# Patient Record
Sex: MALE | Race: BLACK OR AFRICAN AMERICAN | NOT HISPANIC OR LATINO | Employment: FULL TIME | URBAN - METROPOLITAN AREA
[De-identification: names, ages, dates, MRNs, and addresses within clinical notes are randomized per-mention and may not be internally consistent; named-entity substitution may affect disease eponyms.]

---

## 2017-02-17 RX ORDER — ALLOPURINOL 100 MG/1
TABLET ORAL
Qty: 90 TABLET | Refills: 0 | Status: SHIPPED | OUTPATIENT
Start: 2017-02-17 | End: 2017-05-16 | Stop reason: SDUPTHER

## 2017-05-16 RX ORDER — ALLOPURINOL 100 MG/1
TABLET ORAL
Qty: 90 TABLET | Refills: 0 | Status: SHIPPED | OUTPATIENT
Start: 2017-05-16 | End: 2017-08-15 | Stop reason: SDUPTHER

## 2017-07-19 ENCOUNTER — TELEPHONE (OUTPATIENT)
Dept: FAMILY MEDICINE | Facility: CLINIC | Age: 47
End: 2017-07-19

## 2017-07-19 RX ORDER — CEPHALEXIN 500 MG/1
1000 CAPSULE ORAL 2 TIMES DAILY
Qty: 28 CAPSULE | Refills: 0 | Status: SHIPPED | OUTPATIENT
Start: 2017-07-19 | End: 2017-08-21 | Stop reason: ALTCHOICE

## 2017-07-19 NOTE — TELEPHONE ENCOUNTER
----- Message from Angie aTpia sent at 7/19/2017  2:18 PM CDT -----  Patient requesting antibiotic. Has infected tooth. Started about 2 days ago with pain & bleeding. Patient in meeting all this week with work & is unable to come in for appointment. Has appointment with dentist next Tues. Just needs something to get relief until his appt

## 2017-07-19 NOTE — TELEPHONE ENCOUNTER
Patient advised medication sent to pharmacy and a OV with Dr. Wade is needed for Hypertension. Patient states he will contact office back to schedule

## 2017-08-15 RX ORDER — ALLOPURINOL 100 MG/1
TABLET ORAL
Qty: 90 TABLET | Refills: 0 | Status: SHIPPED | OUTPATIENT
Start: 2017-08-15 | End: 2017-08-21 | Stop reason: SDUPTHER

## 2017-08-21 ENCOUNTER — OFFICE VISIT (OUTPATIENT)
Dept: FAMILY MEDICINE | Facility: CLINIC | Age: 47
End: 2017-08-21
Payer: COMMERCIAL

## 2017-08-21 VITALS
WEIGHT: 231.5 LBS | SYSTOLIC BLOOD PRESSURE: 130 MMHG | OXYGEN SATURATION: 100 % | BODY MASS INDEX: 30.68 KG/M2 | HEART RATE: 100 BPM | DIASTOLIC BLOOD PRESSURE: 82 MMHG | TEMPERATURE: 99 F | HEIGHT: 73 IN

## 2017-08-21 DIAGNOSIS — T78.40XA ALLERGIC REACTION, INITIAL ENCOUNTER: Primary | ICD-10-CM

## 2017-08-21 PROCEDURE — 3008F BODY MASS INDEX DOCD: CPT | Mod: S$GLB,,, | Performed by: FAMILY MEDICINE

## 2017-08-21 PROCEDURE — 3075F SYST BP GE 130 - 139MM HG: CPT | Mod: S$GLB,,, | Performed by: FAMILY MEDICINE

## 2017-08-21 PROCEDURE — 99213 OFFICE O/P EST LOW 20 MIN: CPT | Mod: S$GLB,,, | Performed by: FAMILY MEDICINE

## 2017-08-21 PROCEDURE — 3079F DIAST BP 80-89 MM HG: CPT | Mod: S$GLB,,, | Performed by: FAMILY MEDICINE

## 2017-08-21 RX ORDER — TRAMADOL HYDROCHLORIDE 50 MG/1
50 TABLET ORAL EVERY 6 HOURS PRN
Qty: 30 TABLET | Refills: 0 | Status: SHIPPED | OUTPATIENT
Start: 2017-08-21 | End: 2017-08-31

## 2017-08-21 RX ORDER — PREDNISONE 20 MG/1
TABLET ORAL
Qty: 12 TABLET | Refills: 0 | Status: SHIPPED | OUTPATIENT
Start: 2017-08-21 | End: 2017-11-07

## 2017-08-21 RX ORDER — AMOXICILLIN 500 MG/1
1000 TABLET, FILM COATED ORAL EVERY 12 HOURS
Qty: 40 TABLET | Refills: 0 | Status: SHIPPED | OUTPATIENT
Start: 2017-08-21 | End: 2017-08-31

## 2017-08-28 NOTE — PROGRESS NOTES
Patient ID: José Roberson is a 46 y.o. male.    Chief Complaint: Medication Reaction (rash all over body)    HPI       José Roberson is a 46 y.o. male here complaining of hives on chest back arms and legs.  No wheezing no shortness of breath using some over-the-counter medicines without full relief.  Unsure exactly what caused with no new medicines ingested any new foods or other products.      Review of Symptoms    Constitutional  No change in activity, No chills fever   Resp  Neg hemoptysis, stridor, choking  CVS  Neg chest pain, palpitations    Physical Exam    Constitutional:   Oriented to person, place, and time.appears well-developed and well-nourished.   No distress.     HENT  Head: Normocephalic and atraumatic  Right Ear: External ear normal.   Left Ear: External ear normal.   Nose: External nose normal.   Mouth: Moist mucous membranes    Eyes:   Conjunctivae are normal. Right eye exhibits no discharge. Left eye exhibits no discharge. No scleral icterus. No periorbital edema    Pulmonary:  Lungs clear bilaterally without wheezing      Musculoskeletal:  No edema. No obvious deformity No waisting     Neurological:  Alert and oriented to person, place, and time. Coordination normal.     Skin:   Skin is warm and dry.  No diaphoresis.   Diffuse hives on trunk arms legs    Psychiatric: Normal mood and affect. Behavior is normal. Judgment and thought content normal.       Assessment / Plan:      ICD-10-CM ICD-9-CM   1. Allergic reaction, initial encounter T78.40XA 995.3     Allergic reaction, initial encounter  Comments:  Not severe-not involving respiratory    Other orders  -     amoxicillin (AMOXIL) 500 MG Tab; Take 2 tablets (1,000 mg total) by mouth every 12 (twelve) hours.  Dispense: 40 tablet; Refill: 0  -     tramadol (ULTRAM) 50 mg tablet; Take 1 tablet (50 mg total) by mouth every 6 (six) hours as needed for Pain.  Dispense: 30 tablet; Refill: 0  -     predniSONE (DELTASONE) 20 MG tablet; Two daily for  4 days then one daily for 4 days  Dispense: 12 tablet; Refill: 0

## 2017-10-30 ENCOUNTER — TELEPHONE (OUTPATIENT)
Dept: FAMILY MEDICINE | Facility: CLINIC | Age: 47
End: 2017-10-30

## 2017-10-30 DIAGNOSIS — E79.0 ELEVATED URIC ACID IN BLOOD: ICD-10-CM

## 2017-10-30 DIAGNOSIS — Z00.00 ROUTINE HEALTH MAINTENANCE: Primary | ICD-10-CM

## 2017-10-30 NOTE — TELEPHONE ENCOUNTER
----- Message from Angie Tapia sent at 10/30/2017  8:21 AM CDT -----  Patient needs order to wellness blood work. Patient would also like orders to include HIV testing. Please send orders to [facility]. Call patient once done

## 2017-11-01 LAB
ALBUMIN SERPL-MCNC: 4.5 G/DL (ref 3.6–5.1)
ALBUMIN/GLOB SERPL: 1.6 (CALC) (ref 1–2.5)
ALP SERPL-CCNC: 100 U/L (ref 40–115)
ALT SERPL-CCNC: 18 U/L (ref 9–46)
AST SERPL-CCNC: 28 U/L (ref 10–40)
BASOPHILS # BLD AUTO: 19 CELLS/UL (ref 0–200)
BASOPHILS NFR BLD AUTO: 0.3 %
BILIRUB SERPL-MCNC: 1 MG/DL (ref 0.2–1.2)
BUN SERPL-MCNC: 15 MG/DL (ref 7–25)
BUN/CREAT SERPL: NORMAL (CALC) (ref 6–22)
CALCIUM SERPL-MCNC: 9.5 MG/DL (ref 8.6–10.3)
CHLORIDE SERPL-SCNC: 106 MMOL/L (ref 98–110)
CHOLEST SERPL-MCNC: 166 MG/DL
CHOLEST/HDLC SERPL: 3.1 (CALC)
CO2 SERPL-SCNC: 25 MMOL/L (ref 20–31)
CREAT SERPL-MCNC: 1.09 MG/DL (ref 0.6–1.35)
EOSINOPHIL # BLD AUTO: 19 CELLS/UL (ref 15–500)
EOSINOPHIL NFR BLD AUTO: 0.3 %
ERYTHROCYTE [DISTWIDTH] IN BLOOD BY AUTOMATED COUNT: 13.3 % (ref 11–15)
GFR SERPL CREATININE-BSD FRML MDRD: 82 ML/MIN/1.73M2
GLOBULIN SER CALC-MCNC: 2.8 G/DL (CALC) (ref 1.9–3.7)
GLUCOSE SERPL-MCNC: 90 MG/DL (ref 65–99)
HBA1C MFR BLD: 5.2 % OF TOTAL HGB
HCT VFR BLD AUTO: 44.3 % (ref 38.5–50)
HDLC SERPL-MCNC: 54 MG/DL
HGB BLD-MCNC: 14.6 G/DL (ref 13.2–17.1)
HIV 1+2 AB+HIV1 P24 AG SERPL QL IA: NORMAL
LDLC SERPL CALC-MCNC: 98 MG/DL (CALC)
LYMPHOCYTES # BLD AUTO: 1843 CELLS/UL (ref 850–3900)
LYMPHOCYTES NFR BLD AUTO: 28.8 %
MCH RBC QN AUTO: 28.2 PG (ref 27–33)
MCHC RBC AUTO-ENTMCNC: 33 G/DL (ref 32–36)
MCV RBC AUTO: 85.5 FL (ref 80–100)
MONOCYTES # BLD AUTO: 320 CELLS/UL (ref 200–950)
MONOCYTES NFR BLD AUTO: 5 %
NEUTROPHILS # BLD AUTO: 4198 CELLS/UL (ref 1500–7800)
NEUTROPHILS NFR BLD AUTO: 65.6 %
NONHDLC SERPL-MCNC: 112 MG/DL (CALC)
PLATELET # BLD AUTO: 257 THOUSAND/UL (ref 140–400)
PMV BLD REES-ECKER: 10.6 FL (ref 7.5–12.5)
POTASSIUM SERPL-SCNC: 3.9 MMOL/L (ref 3.5–5.3)
PROT SERPL-MCNC: 7.3 G/DL (ref 6.1–8.1)
RBC # BLD AUTO: 5.18 MILLION/UL (ref 4.2–5.8)
SODIUM SERPL-SCNC: 140 MMOL/L (ref 135–146)
TRIGL SERPL-MCNC: 54 MG/DL
TSH SERPL-ACNC: 0.82 MIU/L (ref 0.4–4.5)
URATE SERPL-MCNC: 6.6 MG/DL (ref 4–8)
WBC # BLD AUTO: 6.4 THOUSAND/UL (ref 3.8–10.8)

## 2017-11-02 ENCOUNTER — TELEPHONE (OUTPATIENT)
Dept: FAMILY MEDICINE | Facility: CLINIC | Age: 47
End: 2017-11-02

## 2017-11-02 NOTE — TELEPHONE ENCOUNTER
----- Message from Shan Bridges MD sent at 11/1/2017  6:49 PM CDT -----  Lab Results are normal-  NO changes need to be made    Letter written

## 2017-11-07 ENCOUNTER — OFFICE VISIT (OUTPATIENT)
Dept: FAMILY MEDICINE | Facility: CLINIC | Age: 47
End: 2017-11-07
Payer: COMMERCIAL

## 2017-11-07 VITALS
TEMPERATURE: 98 F | BODY MASS INDEX: 30.7 KG/M2 | WEIGHT: 231.63 LBS | HEART RATE: 108 BPM | OXYGEN SATURATION: 100 % | SYSTOLIC BLOOD PRESSURE: 142 MMHG | HEIGHT: 73 IN | DIASTOLIC BLOOD PRESSURE: 84 MMHG

## 2017-11-07 DIAGNOSIS — R68.82 LOW LIBIDO: ICD-10-CM

## 2017-11-07 DIAGNOSIS — Z00.00 ROUTINE HEALTH MAINTENANCE: Primary | ICD-10-CM

## 2017-11-07 DIAGNOSIS — I10 HYPERTENSION, UNSPECIFIED TYPE: ICD-10-CM

## 2017-11-07 DIAGNOSIS — M10.9 GOUT, UNSPECIFIED CAUSE, UNSPECIFIED CHRONICITY, UNSPECIFIED SITE: ICD-10-CM

## 2017-11-07 DIAGNOSIS — N52.9 ERECTILE DYSFUNCTION, UNSPECIFIED ERECTILE DYSFUNCTION TYPE: ICD-10-CM

## 2017-11-07 PROCEDURE — 99396 PREV VISIT EST AGE 40-64: CPT | Mod: S$GLB,,, | Performed by: FAMILY MEDICINE

## 2017-11-07 RX ORDER — AMLODIPINE BESYLATE 10 MG/1
10 TABLET ORAL DAILY
Refills: 3 | COMMUNITY
Start: 2017-10-20 | End: 2018-02-18

## 2017-11-12 LAB
TESTOST FREE SERPL-MCNC: 68.8 PG/ML (ref 35–155)
TESTOST SERPL-MCNC: 486 NG/DL (ref 250–1100)

## 2017-11-13 NOTE — PROGRESS NOTES
Patient ID: José Roberson is a 46 y.o. male.    Chief Complaint: Annual Exam    HPI      José Roberson is a 46 y.o. male. here for annual exam.   Co of low libido and decreased tumescence  History of gout      Review of Symptoms    Constitutional: Negative.    HENT: Negative.    Eyes: Negative.    Respiratory: Negative.    Cardiovascular: Negative.    Gastrointestinal: Negative.    Endocrine: Negative.    Genitourinary: Negative.    Musculoskeletal: Negative.    Skin: Negative.    Allergic/Immunologic: Negative.    Neurological: Negative.    Hematological: Negative.    Psychiatric/Behavioral: Negative.      Except as above in HPI        Physical  Exam    Constitutional:  Oriented to person, place, and time. Appears well-developed and well-nourished.     HENT:   Head: Normocephalic and atraumatic.     Right Ear: Tympanic membrane, external ear and ear canal normal.     Left Ear: Tympanic membrane, external ear and ear canal normal.     Nose: Nose normal. No rhinorrhea or nasal deformity.     Mouth/Throat: Uvula is midline, oropharynx is clear and moist and mucous membranes are normal.      Eyes: Conjunctivae are normal. Right eye exhibits no discharge. Left eye exhibits no discharge. No scleral icterus.     Neck:  No JVD present. No tracheal deviation  []  Neck supple.   []  No Carotid bruit    Cardiovascular: Normal rate, regular rhythm and normal heart sounds.      Pulmonary/Chest: Effort normal and breath sounds normal. No stridor. No respiratory distress. No wheezes. No rales.      Musculoskeletal: Normal range of motion. No edema or tenderness.   No deformity     Lymphadenopathy:  No cervical adenopathy.     Neurological:  Alert and oriented to person, place, and time. Coordination normal.     Skin: Skin is warm and dry. No rash noted.     Psychiatric: Normal mood and affect. Speech is normal and behavior is normal. Judgment and thought content normal.     Complete Blood Count  Lab Results   Component Value  Date    RBC 5.18 10/31/2017    HGB 14.6 10/31/2017    HCT 44.3 10/31/2017    MCV 85.5 10/31/2017    MCH 28.2 10/31/2017    MCHC 33.0 10/31/2017    RDW 13.3 10/31/2017     10/31/2017    MPV 10.6 10/31/2017    GRAN 3.2 11/07/2016    GRAN 54.0 11/07/2016    LYMPH 1,843 10/31/2017    LYMPH 28.8 10/31/2017    MONO 320 10/31/2017    MONO 5.0 10/31/2017    EOS 19 10/31/2017    BASO 19 10/31/2017    EOSINOPHIL 0.3 10/31/2017    BASOPHIL 0.3 10/31/2017    DIFFMETHOD Automated 11/07/2016       Comprehensive Metabolic Panel  Lab Results   Component Value Date    GLU 90 10/31/2017    BUN 15 10/31/2017    CREATININE 1.09 10/31/2017     10/31/2017    K 3.9 10/31/2017     10/31/2017    PROT 7.3 10/31/2017    ALBUMIN 4.5 10/31/2017    BILITOT 1.0 10/31/2017    AST 28 10/31/2017    ALKPHOS 100 10/31/2017    CO2 25 10/31/2017    ALT 18 10/31/2017    EGFRNONAA 82 10/31/2017    ESTGFRAFRICA 95 10/31/2017       TSH  Lab Results   Component Value Date    TSH 0.82 10/31/2017       Assessment / Plan:      ICD-10-CM ICD-9-CM   1. Routine health maintenance Z00.00 V70.0   2. Hypertension, unspecified type I10 401.9   3. Gout, unspecified cause, unspecified chronicity, unspecified site M10.9 274.9   4. Low libido R68.82 799.81   5. Erectile dysfunction, unspecified erectile dysfunction type N52.9 607.84     Routine health maintenance    Hypertension, unspecified type    Gout, unspecified cause, unspecified chronicity, unspecified site    Low libido  -     Cancel: TESTOSTERONE, FREE, TOTAL; Future; Expected date: 11/21/2017  -     TESTOSTERONE, FREE, TOTAL; Future; Expected date: 11/21/2017    Erectile dysfunction, unspecified erectile dysfunction type  -     Cancel: TESTOSTERONE, FREE, TOTAL; Future; Expected date: 11/21/2017  -     TESTOSTERONE, FREE, TOTAL; Future; Expected date: 11/21/2017          Discussed how to stay healthy including: diet, exercise, refraining from smoking and discussed screening exams / tests  needed for age, sex and family Hx.

## 2017-11-14 ENCOUNTER — TELEPHONE (OUTPATIENT)
Dept: FAMILY MEDICINE | Facility: CLINIC | Age: 47
End: 2017-11-14

## 2017-11-14 NOTE — TELEPHONE ENCOUNTER
----- Message from Shan Bridges MD sent at 11/14/2017  8:52 AM CST -----  Testosterone level is  Normal - pt notified

## 2017-11-18 RX ORDER — ALLOPURINOL 100 MG/1
TABLET ORAL
Qty: 90 TABLET | Refills: 0 | Status: SHIPPED | OUTPATIENT
Start: 2017-11-18 | End: 2018-02-18 | Stop reason: SDUPTHER

## 2017-11-18 RX ORDER — AMLODIPINE BESYLATE 10 MG/1
TABLET ORAL
Qty: 90 TABLET | Refills: 0 | Status: SHIPPED | OUTPATIENT
Start: 2017-11-18 | End: 2018-02-18 | Stop reason: SDUPTHER

## 2018-02-18 RX ORDER — AMLODIPINE BESYLATE 10 MG/1
TABLET ORAL
Qty: 90 TABLET | Refills: 0 | Status: SHIPPED | OUTPATIENT
Start: 2018-02-18 | End: 2018-05-22 | Stop reason: SDUPTHER

## 2018-02-18 RX ORDER — ALLOPURINOL 100 MG/1
TABLET ORAL
Qty: 90 TABLET | Refills: 0 | Status: SHIPPED | OUTPATIENT
Start: 2018-02-18 | End: 2018-05-22 | Stop reason: SDUPTHER

## 2018-05-22 RX ORDER — ALLOPURINOL 100 MG/1
TABLET ORAL
Qty: 90 TABLET | Refills: 0 | Status: SHIPPED | OUTPATIENT
Start: 2018-05-22 | End: 2018-09-01 | Stop reason: SDUPTHER

## 2018-05-22 RX ORDER — AMLODIPINE BESYLATE 10 MG/1
TABLET ORAL
Qty: 90 TABLET | Refills: 0 | Status: SHIPPED | OUTPATIENT
Start: 2018-05-22 | End: 2018-09-01 | Stop reason: SDUPTHER

## 2018-09-01 ENCOUNTER — TELEPHONE (OUTPATIENT)
Dept: FAMILY MEDICINE | Facility: CLINIC | Age: 48
End: 2018-09-01

## 2018-09-01 RX ORDER — ALLOPURINOL 100 MG/1
TABLET ORAL
Qty: 90 TABLET | Refills: 0 | Status: SHIPPED | OUTPATIENT
Start: 2018-09-01 | End: 2018-12-03 | Stop reason: SDUPTHER

## 2018-09-01 RX ORDER — AMLODIPINE BESYLATE 10 MG/1
TABLET ORAL
Qty: 90 TABLET | Refills: 0 | Status: SHIPPED | OUTPATIENT
Start: 2018-09-01 | End: 2018-12-03 | Stop reason: SDUPTHER

## 2018-09-04 ENCOUNTER — OFFICE VISIT (OUTPATIENT)
Dept: FAMILY MEDICINE | Facility: CLINIC | Age: 48
End: 2018-09-04
Payer: COMMERCIAL

## 2018-09-04 VITALS
HEART RATE: 85 BPM | TEMPERATURE: 98 F | SYSTOLIC BLOOD PRESSURE: 142 MMHG | WEIGHT: 235 LBS | HEIGHT: 73 IN | DIASTOLIC BLOOD PRESSURE: 98 MMHG | BODY MASS INDEX: 31.14 KG/M2 | OXYGEN SATURATION: 100 %

## 2018-09-04 DIAGNOSIS — I10 ESSENTIAL HYPERTENSION: ICD-10-CM

## 2018-09-04 DIAGNOSIS — Z23 NEED FOR TETANUS BOOSTER: ICD-10-CM

## 2018-09-04 DIAGNOSIS — M25.473 ANKLE EDEMA: Primary | ICD-10-CM

## 2018-09-04 PROCEDURE — 90715 TDAP VACCINE 7 YRS/> IM: CPT | Mod: S$GLB,,, | Performed by: NURSE PRACTITIONER

## 2018-09-04 PROCEDURE — 90471 IMMUNIZATION ADMIN: CPT | Mod: S$GLB,,, | Performed by: NURSE PRACTITIONER

## 2018-09-04 PROCEDURE — 3008F BODY MASS INDEX DOCD: CPT | Mod: CPTII,S$GLB,, | Performed by: NURSE PRACTITIONER

## 2018-09-04 PROCEDURE — 3080F DIAST BP >= 90 MM HG: CPT | Mod: CPTII,S$GLB,, | Performed by: NURSE PRACTITIONER

## 2018-09-04 PROCEDURE — 3077F SYST BP >= 140 MM HG: CPT | Mod: CPTII,S$GLB,, | Performed by: NURSE PRACTITIONER

## 2018-09-04 PROCEDURE — 99213 OFFICE O/P EST LOW 20 MIN: CPT | Mod: 25,S$GLB,, | Performed by: NURSE PRACTITIONER

## 2018-09-04 RX ORDER — LISINOPRIL 20 MG/1
20 TABLET ORAL DAILY
Qty: 30 TABLET | Refills: 5 | Status: SHIPPED | OUTPATIENT
Start: 2018-09-04 | End: 2019-04-25 | Stop reason: SDUPTHER

## 2018-09-04 RX ORDER — HYDROCHLOROTHIAZIDE 12.5 MG/1
12.5 CAPSULE ORAL DAILY
Qty: 30 CAPSULE | Refills: 0 | Status: SHIPPED | OUTPATIENT
Start: 2018-09-04 | End: 2018-10-01 | Stop reason: SDUPTHER

## 2018-09-04 NOTE — PROGRESS NOTES
Subjective:       Patient ID: José Roberson is a 47 y.o. male.    Chief Complaint: Leg Swelling    Edema   This is a new problem. The current episode started in the past 7 days. The problem occurs intermittently. The problem has been unchanged. Associated symptoms include joint swelling. Pertinent negatives include no abdominal pain, anorexia, arthralgias, change in bowel habit, chest pain, chills, congestion, coughing, diaphoresis, fatigue, fever, headaches, myalgias, nausea, neck pain, numbness, rash, sore throat, swollen glands, urinary symptoms, vertigo, visual change, vomiting or weakness. He has tried nothing for the symptoms.     Review of Systems   Constitutional: Negative for chills, diaphoresis, fatigue and fever.   HENT: Negative for congestion and sore throat.    Respiratory: Negative for cough.    Cardiovascular: Positive for leg swelling. Negative for chest pain.   Gastrointestinal: Negative for abdominal pain, anorexia, change in bowel habit, nausea and vomiting.   Musculoskeletal: Positive for joint swelling. Negative for arthralgias, myalgias and neck pain.        Ankle swelling     Skin: Negative for rash.   Neurological: Negative for vertigo, weakness, numbness and headaches.       Objective:      Physical Exam   Constitutional: He is oriented to person, place, and time. He appears well-developed and well-nourished. No distress.   HENT:   Right Ear: External ear normal.   Left Ear: External ear normal.   Cardiovascular: Normal rate, regular rhythm and normal heart sounds.   No murmur heard.  Pulses:       Dorsalis pedis pulses are 2+ on the right side, and 2+ on the left side.   Pulmonary/Chest: Effort normal and breath sounds normal. No stridor. No respiratory distress.   Musculoskeletal: He exhibits edema.        Right ankle: He exhibits swelling.        Left ankle: He exhibits swelling.   Neurological: He is alert and oriented to person, place, and time.   Skin: Skin is warm and dry. He is not  diaphoretic.   Psychiatric: He has a normal mood and affect. His behavior is normal. Judgment and thought content normal.   Vitals reviewed.      Assessment:       1. Ankle edema    2. Need for tetanus booster    3. Essential hypertension        Plan:       Ankle edema  -     hydroCHLOROthiazide (MICROZIDE) 12.5 mg capsule; Take 1 capsule (12.5 mg total) by mouth once daily.  Dispense: 30 capsule; Refill: 0    Need for tetanus booster  -     (In Office Administered) Tdap Vaccine    Essential hypertension  -     lisinopril (PRINIVIL,ZESTRIL) 20 MG tablet; Take 1 tablet (20 mg total) by mouth once daily.  Dispense: 30 tablet; Refill: 5      Elevate legs  Limit salt intake  If swelling continues will d/c Norvasc  Continue monitoring BP at home call me with results

## 2018-09-04 NOTE — TELEPHONE ENCOUNTER
Contacted pt to schedule annual wellness exam,  Pt states he needed to be seen sooner due to feet swelling.   Scheduled pt same day appointment with ESTHELA Mota.

## 2018-09-21 ENCOUNTER — CLINICAL SUPPORT (OUTPATIENT)
Dept: FAMILY MEDICINE | Facility: CLINIC | Age: 48
End: 2018-09-21
Payer: COMMERCIAL

## 2018-09-21 ENCOUNTER — TELEPHONE (OUTPATIENT)
Dept: INTERNAL MEDICINE | Facility: CLINIC | Age: 48
End: 2018-09-21

## 2018-09-21 VITALS — SYSTOLIC BLOOD PRESSURE: 122 MMHG | DIASTOLIC BLOOD PRESSURE: 84 MMHG | HEART RATE: 79 BPM

## 2018-09-21 DIAGNOSIS — I10 HYPERTENSION, UNSPECIFIED TYPE: Primary | ICD-10-CM

## 2018-09-21 NOTE — PROGRESS NOTES
José Roberson 47 y.o. male is here today for Blood Pressure check.   History of HTN yes.    Review of patient's allergies indicates:  No Known Allergies  Creatinine   Date Value Ref Range Status   10/31/2017 1.09 0.60 - 1.35 mg/dL Final     Sodium   Date Value Ref Range Status   10/31/2017 140 135 - 146 mmol/L Final     Potassium   Date Value Ref Range Status   10/31/2017 3.9 3.5 - 5.3 mmol/L Final   ]  Patient verifies taking blood pressure medications on a regular basis at the same time of the day.     Current Outpatient Medications:     allopurinol (ZYLOPRIM) 100 MG tablet, TAKE 1 TABLET BY MOUTH EVERY DAY, Disp: 90 tablet, Rfl: 0    amLODIPine (NORVASC) 10 MG tablet, TAKE 1 TABLET(10 MG) BY MOUTH EVERY DAY, Disp: 90 tablet, Rfl: 0    hydroCHLOROthiazide (MICROZIDE) 12.5 mg capsule, Take 1 capsule (12.5 mg total) by mouth once daily., Disp: 30 capsule, Rfl: 0    lisinopril (PRINIVIL,ZESTRIL) 20 MG tablet, Take 1 tablet (20 mg total) by mouth once daily., Disp: 30 tablet, Rfl: 5  Does patient have record of home blood pressure readings no.  Last dose of blood pressure medication was taken at 7am this morning.  Patient is asymptomatic.   Complains of nothing at this time.    BP: 122/84 , Pulse: 79 .      Dr. Bridges notified.

## 2018-10-01 ENCOUNTER — TELEPHONE (OUTPATIENT)
Dept: FAMILY MEDICINE | Facility: CLINIC | Age: 48
End: 2018-10-01

## 2018-10-01 DIAGNOSIS — M25.473 ANKLE EDEMA: ICD-10-CM

## 2018-10-01 RX ORDER — HYDROCHLOROTHIAZIDE 12.5 MG/1
CAPSULE ORAL
Qty: 30 CAPSULE | Refills: 0 | Status: SHIPPED | OUTPATIENT
Start: 2018-10-01 | End: 2018-10-01 | Stop reason: SDUPTHER

## 2018-10-01 RX ORDER — HYDROCHLOROTHIAZIDE 12.5 MG/1
12.5 CAPSULE ORAL DAILY
Qty: 30 CAPSULE | Refills: 0 | Status: SHIPPED | OUTPATIENT
Start: 2018-10-01 | End: 2019-10-31 | Stop reason: SDUPTHER

## 2018-10-01 NOTE — TELEPHONE ENCOUNTER
----- Message from Angela Sanders sent at 10/1/2018  2:34 PM CDT -----  Contact: Sol martin/ [facility]/ [phone]  Pharmacy called in to get clarification on patient's prescription. hydroCHLOROthiazide (MICROZIDE) 12.5 mg capsule    Please call.

## 2018-12-03 RX ORDER — ALLOPURINOL 100 MG/1
TABLET ORAL
Qty: 90 TABLET | Refills: 0 | Status: SHIPPED | OUTPATIENT
Start: 2018-12-03 | End: 2019-03-18 | Stop reason: SDUPTHER

## 2018-12-03 RX ORDER — AMLODIPINE BESYLATE 10 MG/1
TABLET ORAL
Qty: 90 TABLET | Refills: 0 | Status: SHIPPED | OUTPATIENT
Start: 2018-12-03 | End: 2019-03-18 | Stop reason: SDUPTHER

## 2019-01-29 ENCOUNTER — CLINICAL SUPPORT (OUTPATIENT)
Dept: FAMILY MEDICINE | Facility: CLINIC | Age: 49
End: 2019-01-29
Payer: COMMERCIAL

## 2019-01-29 DIAGNOSIS — Z23 NEED FOR INFLUENZA VACCINATION: Primary | ICD-10-CM

## 2019-01-29 PROCEDURE — 90471 IMMUNIZATION ADMIN: CPT | Mod: S$GLB,,, | Performed by: FAMILY MEDICINE

## 2019-01-29 PROCEDURE — 90686 IIV4 VACC NO PRSV 0.5 ML IM: CPT | Mod: S$GLB,,, | Performed by: FAMILY MEDICINE

## 2019-01-29 PROCEDURE — 90686 FLU VACCINE (QUAD) GREATER THAN OR EQUAL TO 3YO PRESERVATIVE FREE IM: ICD-10-PCS | Mod: S$GLB,,, | Performed by: FAMILY MEDICINE

## 2019-01-29 PROCEDURE — 90471 FLU VACCINE (QUAD) GREATER THAN OR EQUAL TO 3YO PRESERVATIVE FREE IM: ICD-10-PCS | Mod: S$GLB,,, | Performed by: FAMILY MEDICINE

## 2019-03-18 RX ORDER — ALLOPURINOL 100 MG/1
TABLET ORAL
Qty: 90 TABLET | Refills: 0 | Status: SHIPPED | OUTPATIENT
Start: 2019-03-18 | End: 2019-06-20 | Stop reason: SDUPTHER

## 2019-03-18 RX ORDER — AMLODIPINE BESYLATE 10 MG/1
TABLET ORAL
Qty: 90 TABLET | Refills: 0 | Status: SHIPPED | OUTPATIENT
Start: 2019-03-18 | End: 2019-06-20 | Stop reason: SDUPTHER

## 2019-04-25 DIAGNOSIS — I10 ESSENTIAL HYPERTENSION: ICD-10-CM

## 2019-04-25 RX ORDER — LISINOPRIL 20 MG/1
20 TABLET ORAL DAILY
Qty: 30 TABLET | Refills: 5 | Status: SHIPPED | OUTPATIENT
Start: 2019-04-25 | End: 2019-11-19

## 2019-04-25 NOTE — TELEPHONE ENCOUNTER
----- Message from Vicky Rutherford sent at 4/25/2019 12:05 PM CDT -----  Type:  RX Refill Request    Who Called: José Roberson   Refill or New Rx:refill   RX Name and Strength:lisinopril (PRINIVIL,ZESTRIL) 20 MG tablet  How is the patient currently taking it? (ex. 1XDay): Take 1 tablet (20 mg total) by mouth once daily  Is this a 30 day or 90 day RX:30 day  Preferred Pharmacy with phone number: [address]  Local or Mail Order:local  Ordering Provider:Dr. Falcon  Would the patient rather a call back or a response via [facility]? callback  Best Call Back Number:[phone]  Additional Information:n/a

## 2019-04-25 NOTE — TELEPHONE ENCOUNTER
----- Message from Vicky Rutherford sent at 4/25/2019 12:05 PM CDT -----  Type:  RX Refill Request    Who Called: José Roberson   Refill or New Rx:refill   RX Name and Strength:lisinopril (PRINIVIL,ZESTRIL) 20 MG tablet  How is the patient currently taking it? (ex. 1XDay): Take 1 tablet (20 mg total) by mouth once daily  Is this a 30 day or 90 day RX:30 day  Preferred Pharmacy with phone number: [address]  Local or Mail Order:local  Ordering Provider:Dr. Falcon  Would the patient rather a call back or a response via [facility]? callback  Best Call Back Number:[phone]  Additional Information:n/a

## 2019-06-20 RX ORDER — AMLODIPINE BESYLATE 10 MG/1
TABLET ORAL
Qty: 90 TABLET | Refills: 0 | Status: SHIPPED | OUTPATIENT
Start: 2019-06-20 | End: 2019-10-02 | Stop reason: SDUPTHER

## 2019-06-20 RX ORDER — ALLOPURINOL 100 MG/1
TABLET ORAL
Qty: 90 TABLET | Refills: 0 | Status: SHIPPED | OUTPATIENT
Start: 2019-06-20 | End: 2019-10-02 | Stop reason: SDUPTHER

## 2019-10-02 ENCOUNTER — TELEPHONE (OUTPATIENT)
Dept: FAMILY MEDICINE | Facility: CLINIC | Age: 49
End: 2019-10-02

## 2019-10-02 RX ORDER — ALLOPURINOL 100 MG/1
TABLET ORAL
Qty: 30 TABLET | Refills: 0 | Status: SHIPPED | OUTPATIENT
Start: 2019-10-02 | End: 2019-10-31 | Stop reason: SDUPTHER

## 2019-10-02 RX ORDER — AMLODIPINE BESYLATE 10 MG/1
TABLET ORAL
Qty: 30 TABLET | Refills: 0 | Status: SHIPPED | OUTPATIENT
Start: 2019-10-02 | End: 2019-10-31 | Stop reason: SDUPTHER

## 2019-10-02 NOTE — TELEPHONE ENCOUNTER
Sent a refill for 30 pills   Needs to come in to see MD before additional medication given at lest blood pressure taken

## 2019-10-07 ENCOUNTER — PATIENT OUTREACH (OUTPATIENT)
Dept: ADMINISTRATIVE | Facility: HOSPITAL | Age: 49
End: 2019-10-07

## 2019-10-31 ENCOUNTER — TELEPHONE (OUTPATIENT)
Dept: FAMILY MEDICINE | Facility: CLINIC | Age: 49
End: 2019-10-31

## 2019-10-31 DIAGNOSIS — M25.473 ANKLE EDEMA: ICD-10-CM

## 2019-10-31 RX ORDER — ALLOPURINOL 100 MG/1
100 TABLET ORAL DAILY
Qty: 90 TABLET | Refills: 0 | Status: SHIPPED | OUTPATIENT
Start: 2019-10-31 | End: 2019-11-19 | Stop reason: SDUPTHER

## 2019-10-31 RX ORDER — HYDROCHLOROTHIAZIDE 12.5 MG/1
12.5 CAPSULE ORAL DAILY
Qty: 90 CAPSULE | Refills: 0 | Status: SHIPPED | OUTPATIENT
Start: 2019-10-31 | End: 2019-11-19

## 2019-10-31 RX ORDER — AMLODIPINE BESYLATE 10 MG/1
10 TABLET ORAL DAILY
Qty: 90 TABLET | Refills: 0 | Status: SHIPPED | OUTPATIENT
Start: 2019-10-31 | End: 2019-11-19 | Stop reason: SDUPTHER

## 2019-10-31 NOTE — TELEPHONE ENCOUNTER
----- Message from Ita Molina sent at 10/31/2019  2:09 PM CDT -----  Contact: [phone]/SELF  Type:  Sooner Apoointment Request  .  Name of Caller: call  When is the first available appointment? 11/19  Symptoms: ANNUAL/medication refills  Would the patient rather a call back or a response via [facility]?  call    Additional Information:  Only [place]    I spoke with the pt - he is scheduled for 11/19/19  With dr Nelly menjivar gave him 30 day supply but his insurance will not fill it   It has to be 90 day  He will come in sooner but nothing available  Could you refill 90 days until his appt  Last appt was 9/2018    Pt needs allopurinol, amlodipine and hctz  He said he is not taking the lisinopril

## 2019-11-19 ENCOUNTER — OFFICE VISIT (OUTPATIENT)
Dept: FAMILY MEDICINE | Facility: CLINIC | Age: 49
End: 2019-11-19
Payer: COMMERCIAL

## 2019-11-19 VITALS
DIASTOLIC BLOOD PRESSURE: 104 MMHG | WEIGHT: 243.81 LBS | BODY MASS INDEX: 32.31 KG/M2 | SYSTOLIC BLOOD PRESSURE: 176 MMHG | OXYGEN SATURATION: 98 % | HEART RATE: 92 BPM | HEIGHT: 73 IN | TEMPERATURE: 98 F

## 2019-11-19 DIAGNOSIS — Z00.00 ROUTINE HEALTH MAINTENANCE: Primary | ICD-10-CM

## 2019-11-19 DIAGNOSIS — I10 ESSENTIAL HYPERTENSION: ICD-10-CM

## 2019-11-19 DIAGNOSIS — N52.9 ERECTILE DYSFUNCTION, UNSPECIFIED ERECTILE DYSFUNCTION TYPE: ICD-10-CM

## 2019-11-19 DIAGNOSIS — I10 HYPERTENSION, UNSPECIFIED TYPE: ICD-10-CM

## 2019-11-19 PROCEDURE — 3080F PR MOST RECENT DIASTOLIC BLOOD PRESSURE >= 90 MM HG: ICD-10-PCS | Mod: CPTII,S$GLB,, | Performed by: FAMILY MEDICINE

## 2019-11-19 PROCEDURE — 3077F PR MOST RECENT SYSTOLIC BLOOD PRESSURE >= 140 MM HG: ICD-10-PCS | Mod: CPTII,S$GLB,, | Performed by: FAMILY MEDICINE

## 2019-11-19 PROCEDURE — 99396 PREV VISIT EST AGE 40-64: CPT | Mod: S$GLB,,, | Performed by: FAMILY MEDICINE

## 2019-11-19 PROCEDURE — 99396 PR PREVENTIVE VISIT,EST,40-64: ICD-10-PCS | Mod: S$GLB,,, | Performed by: FAMILY MEDICINE

## 2019-11-19 PROCEDURE — 3077F SYST BP >= 140 MM HG: CPT | Mod: CPTII,S$GLB,, | Performed by: FAMILY MEDICINE

## 2019-11-19 PROCEDURE — 3080F DIAST BP >= 90 MM HG: CPT | Mod: CPTII,S$GLB,, | Performed by: FAMILY MEDICINE

## 2019-11-19 RX ORDER — IRBESARTAN 150 MG/1
150 TABLET ORAL NIGHTLY
Qty: 90 TABLET | Refills: 3 | Status: SHIPPED | OUTPATIENT
Start: 2019-11-19 | End: 2020-02-19

## 2019-11-19 RX ORDER — TERBINAFINE HYDROCHLORIDE 250 MG/1
TABLET ORAL
Qty: 30 TABLET | Refills: 1 | Status: SHIPPED | OUTPATIENT
Start: 2019-11-19 | End: 2021-04-26

## 2019-11-19 RX ORDER — AMLODIPINE BESYLATE 10 MG/1
10 TABLET ORAL DAILY
Qty: 90 TABLET | Refills: 0 | Status: SHIPPED | OUTPATIENT
Start: 2019-11-19 | End: 2020-02-16

## 2019-11-19 RX ORDER — ALLOPURINOL 100 MG/1
100 TABLET ORAL DAILY
Qty: 90 TABLET | Refills: 0 | Status: SHIPPED | OUTPATIENT
Start: 2019-11-19 | End: 2020-02-16

## 2019-11-20 RX ORDER — LISINOPRIL 20 MG/1
TABLET ORAL
Qty: 90 TABLET | Refills: 1 | OUTPATIENT
Start: 2019-11-20

## 2019-11-23 LAB
ALBUMIN SERPL-MCNC: 4.3 G/DL (ref 3.6–5.1)
ALBUMIN/GLOB SERPL: 1.6 (CALC) (ref 1–2.5)
ALP SERPL-CCNC: 85 U/L (ref 40–115)
ALT SERPL-CCNC: 33 U/L (ref 9–46)
AST SERPL-CCNC: 32 U/L (ref 10–40)
BASOPHILS # BLD AUTO: 20 CELLS/UL (ref 0–200)
BASOPHILS NFR BLD AUTO: 0.4 %
BILIRUB SERPL-MCNC: 0.8 MG/DL (ref 0.2–1.2)
BUN SERPL-MCNC: 11 MG/DL (ref 7–25)
BUN/CREAT SERPL: NORMAL (CALC) (ref 6–22)
CALCIUM SERPL-MCNC: 9.2 MG/DL (ref 8.6–10.3)
CHLORIDE SERPL-SCNC: 105 MMOL/L (ref 98–110)
CHOLEST SERPL-MCNC: 162 MG/DL
CHOLEST/HDLC SERPL: 2.9 (CALC)
CO2 SERPL-SCNC: 27 MMOL/L (ref 20–32)
CREAT SERPL-MCNC: 1.09 MG/DL (ref 0.6–1.35)
EOSINOPHIL # BLD AUTO: 30 CELLS/UL (ref 15–500)
EOSINOPHIL NFR BLD AUTO: 0.6 %
ERYTHROCYTE [DISTWIDTH] IN BLOOD BY AUTOMATED COUNT: 13.2 % (ref 11–15)
GFRSERPLBLD MDRD-ARVRAT: 81 ML/MIN/1.73M2
GLOBULIN SER CALC-MCNC: 2.7 G/DL (CALC) (ref 1.9–3.7)
GLUCOSE SERPL-MCNC: 79 MG/DL (ref 65–139)
HCT VFR BLD AUTO: 44.3 % (ref 38.5–50)
HDLC SERPL-MCNC: 55 MG/DL
HGB BLD-MCNC: 15.2 G/DL (ref 13.2–17.1)
LDLC SERPL CALC-MCNC: 93 MG/DL (CALC)
LYMPHOCYTES # BLD AUTO: 2060 CELLS/UL (ref 850–3900)
LYMPHOCYTES NFR BLD AUTO: 41.2 %
MCH RBC QN AUTO: 29.9 PG (ref 27–33)
MCHC RBC AUTO-ENTMCNC: 34.3 G/DL (ref 32–36)
MCV RBC AUTO: 87.2 FL (ref 80–100)
MONOCYTES # BLD AUTO: 290 CELLS/UL (ref 200–950)
MONOCYTES NFR BLD AUTO: 5.8 %
NEUTROPHILS # BLD AUTO: 2600 CELLS/UL (ref 1500–7800)
NEUTROPHILS NFR BLD AUTO: 52 %
NONHDLC SERPL-MCNC: 107 MG/DL (CALC)
PLATELET # BLD AUTO: 215 THOUSAND/UL (ref 140–400)
PMV BLD REES-ECKER: 11 FL (ref 7.5–12.5)
POTASSIUM SERPL-SCNC: 3.7 MMOL/L (ref 3.5–5.3)
PROT SERPL-MCNC: 7 G/DL (ref 6.1–8.1)
RBC # BLD AUTO: 5.08 MILLION/UL (ref 4.2–5.8)
SODIUM SERPL-SCNC: 140 MMOL/L (ref 135–146)
TESTOST FREE SERPL-MCNC: 49.2 PG/ML (ref 46–224)
TRIGL SERPL-MCNC: 53 MG/DL
TSH SERPL-ACNC: 2.08 MIU/L (ref 0.4–4.5)
WBC # BLD AUTO: 5 THOUSAND/UL (ref 3.8–10.8)

## 2019-11-24 NOTE — PROGRESS NOTES
" Patient ID: José Roberson is a 48 y.o. male.    Chief Complaint: Annual Exam    HPI      José Roberson is a 48 y.o. male. here for annual exam.   Complains of a toenail changes and ED.  Also patient with hypertension is not controlled.  Patient stop taking lisinopril because afraid of what it does to ED  Gout-no new problems  Complains of hard dark and toenails.  Would like treatment.        Review of Symptoms    Constitutional: Negative.    HENT: Negative.    Eyes: Negative.    Respiratory: Negative.    Cardiovascular: Negative.    Gastrointestinal: Negative.    Endocrine: Negative.    Genitourinary: Negative.    Musculoskeletal: Negative.    Skin: Negative.    Allergic/Immunologic: Negative.    Neurological: Negative.    Hematological: Negative.    Psychiatric/Behavioral: Negative.      Except as above in HPI      Vitals:    11/19/19 1403 11/19/19 1417   BP: (!) 192/108 (!) 176/104   BP Location:  Left arm   Pulse: 92    Temp: 97.9 °F (36.6 °C)    SpO2: 98%    Weight: 110.6 kg (243 lb 13.3 oz)    Height: 6' 1" (1.854 m)         Physical  Exam      Constitutional:  Oriented to person, place, and time. Appears well-developed and well-nourished.     HENT:   Head: Normocephalic and atraumatic.     Right Ear: Tympanic membrane, ear canal and External ear normal     Left Ear: Tympanic membrane, ear canal and External ear normal     Nose: Nose normal. No rhinorrhea or nasal deformity.     Mouth/Throat: Uvula is midline, oropharynx is clear and moist and mucous membranes are normal.      Eyes: Conjunctivae are normal. Right eye exhibits no discharge. Left eye exhibits no discharge. No scleral icterus.     Neck:  No JVD present. No tracheal deviation  [x]  Neck supple.   [x]  No Carotid bruit    Cardiovascular:  Regular rate and rhythm with normal S1 and S2     Pulmonary/Chest:   Clear to auscultation bilaterally without wheezes, rhonchi or rales    Musculoskeletal: Normal range of motion. No edema or tenderness.   No " deformity     Lymphadenopathy:  No cervical adenopathy.     Neurological:  Alert and oriented to person, place, and time. Coordination normal.     Skin: Skin is warm and dry. No rash noted.   Toe nails-dark thick  Psychiatric: Normal mood and affect. Speech is normal and behavior is normal. Judgment and thought content normal.     Complete Blood Count  Lab Results   Component Value Date    RBC 5.08 11/20/2019    HGB 15.2 11/20/2019    HCT 44.3 11/20/2019    MCV 87.2 11/20/2019    MCH 29.9 11/20/2019    MCHC 34.3 11/20/2019    RDW 13.2 11/20/2019     11/20/2019    MPV 11.0 11/20/2019    GRAN 3.2 11/07/2016    GRAN 54.0 11/07/2016    LYMPH 2,060 11/20/2019    LYMPH 41.2 11/20/2019    MONO 290 11/20/2019    MONO 5.8 11/20/2019    EOS 30 11/20/2019    BASO 20 11/20/2019    EOSINOPHIL 0.6 11/20/2019    BASOPHIL 0.4 11/20/2019    DIFFMETHOD Automated 11/07/2016       Comprehensive Metabolic Panel  Lab Results   Component Value Date    GLU 79 11/20/2019    BUN 11 11/20/2019    CREATININE 1.09 11/20/2019     11/20/2019    K 3.7 11/20/2019     11/20/2019    PROT 7.0 11/20/2019    ALBUMIN 4.3 11/20/2019    BILITOT 0.8 11/20/2019    AST 32 11/20/2019    ALKPHOS 85 11/20/2019    CO2 27 11/20/2019    ALT 33 11/20/2019    EGFRNONAA 81 11/20/2019    ESTGFRAFRICA 94 11/20/2019       TSH  Lab Results   Component Value Date    TSH 2.08 11/20/2019       Assessment / Plan:      ICD-10-CM ICD-9-CM   1. Routine health maintenance Z00.00 V70.0   2. Hypertension, unspecified type I10 401.9   3. Essential hypertension I10 401.9   4. Erectile dysfunction, unspecified erectile dysfunction type N52.9 607.84     Routine health maintenance  -     Testosterone, free; Future; Expected date: 11/19/2019  -     Comprehensive metabolic panel; Future; Expected date: 11/19/2019  -     CBC auto differential; Future; Expected date: 11/19/2019  -     Lipid panel; Future; Expected date: 11/19/2019  -     TSH; Future; Expected date:  11/19/2019    Hypertension, unspecified type  -     Testosterone, free; Future; Expected date: 11/19/2019  -     Comprehensive metabolic panel; Future; Expected date: 11/19/2019  -     CBC auto differential; Future; Expected date: 11/19/2019  -     Lipid panel; Future; Expected date: 11/19/2019  -     TSH; Future; Expected date: 11/19/2019    Essential hypertension  -     Testosterone, free; Future; Expected date: 11/19/2019  -     Comprehensive metabolic panel; Future; Expected date: 11/19/2019  -     CBC auto differential; Future; Expected date: 11/19/2019  -     Lipid panel; Future; Expected date: 11/19/2019  -     TSH; Future; Expected date: 11/19/2019    Erectile dysfunction, unspecified erectile dysfunction type  -     Testosterone, free; Future; Expected date: 11/19/2019    Other orders  -     allopurinol (ZYLOPRIM) 100 MG tablet; Take 1 tablet (100 mg total) by mouth once daily. For gout prevention  Dispense: 90 tablet; Refill: 0  -     amLODIPine (NORVASC) 10 MG tablet; Take 1 tablet (10 mg total) by mouth once daily. For high blood pressure  Dispense: 90 tablet; Refill: 0  -     irbesartan (AVAPRO) 150 MG tablet; Take 1 tablet (150 mg total) by mouth every evening. For blood pressure  Dispense: 90 tablet; Refill: 3  -     terbinafine HCl (LAMISIL) 250 mg tablet; Two po the first 5 days of the month for 3 mo  Dispense: 30 tablet; Refill: 1          Discussed how to stay healthy including: diet, exercise, refraining from smoking and discussed screening exams / tests needed for age, sex and family Hx.

## 2020-02-07 ENCOUNTER — TELEPHONE (OUTPATIENT)
Dept: FAMILY MEDICINE | Facility: CLINIC | Age: 50
End: 2020-02-07

## 2020-02-07 NOTE — TELEPHONE ENCOUNTER
LM advising patient to contact office to discuss test results       ----- Message from Shona Mcgregor sent at 2/7/2020  2:19 PM CST -----  Contact: Self [phone]  Patient would like to speak with you about getting test results. Please advise.

## 2020-02-07 NOTE — TELEPHONE ENCOUNTER
Vicky MULTANI [facility] Staff   Caller: [phone]/self (Today,  2:58 PM)             Patient called in returning your call. Please advise.

## 2020-02-07 NOTE — TELEPHONE ENCOUNTER
I spoke with the pt about his last labs in November   His testosterone level was within range - 49 but barely   Pt is symptomatic  He would like you to call in meds  But no injections    I advised the pt I would get in touch with him next week once Dr Bridges reviews the message as he is out of the office today    [facility]

## 2020-02-07 NOTE — TELEPHONE ENCOUNTER
If pt is referring to last labs in nov - sent to portal ( never reviewed ) also letter was mailed to the pt     See info below

## 2020-02-08 NOTE — TELEPHONE ENCOUNTER
Do not feel comfortable replacing testosterone when it is normal    Would suggest he sees someone like Dr. Herrera      I can make the referral if he would like

## 2020-02-10 NOTE — TELEPHONE ENCOUNTER
Patient advised of recommendation for starting testosterone therapy. Patient got very upset started divina stating he's right at the baseline what do you mean that that's normal and why does he have to see a specialist when the results are showing that his levels are low. He states he want's to speak to his doctor, why can't he speak with him. Patient requesting a call back from Dr. Wade only

## 2020-02-16 RX ORDER — AMLODIPINE BESYLATE 10 MG/1
TABLET ORAL
Qty: 90 TABLET | Refills: 0 | Status: SHIPPED | OUTPATIENT
Start: 2020-02-16 | End: 2020-05-12

## 2020-02-16 RX ORDER — ALLOPURINOL 100 MG/1
100 TABLET ORAL DAILY
Qty: 90 TABLET | Refills: 0 | Status: SHIPPED | OUTPATIENT
Start: 2020-02-16 | End: 2020-05-12

## 2020-02-19 RX ORDER — IRBESARTAN 150 MG/1
150 TABLET ORAL NIGHTLY
Qty: 90 TABLET | Refills: 3 | Status: SHIPPED | OUTPATIENT
Start: 2020-02-19 | End: 2021-03-03

## 2020-03-20 ENCOUNTER — TELEPHONE (OUTPATIENT)
Dept: FAMILY MEDICINE | Facility: CLINIC | Age: 50
End: 2020-03-20

## 2020-03-20 ENCOUNTER — OFFICE VISIT (OUTPATIENT)
Dept: FAMILY MEDICINE | Facility: CLINIC | Age: 50
End: 2020-03-20
Payer: COMMERCIAL

## 2020-03-20 VITALS
OXYGEN SATURATION: 98 % | HEART RATE: 94 BPM | SYSTOLIC BLOOD PRESSURE: 142 MMHG | BODY MASS INDEX: 32.02 KG/M2 | DIASTOLIC BLOOD PRESSURE: 86 MMHG | TEMPERATURE: 99 F | HEIGHT: 73 IN | WEIGHT: 241.63 LBS

## 2020-03-20 DIAGNOSIS — I10 ESSENTIAL HYPERTENSION: ICD-10-CM

## 2020-03-20 DIAGNOSIS — B34.9 VIRAL SYNDROME: Primary | ICD-10-CM

## 2020-03-20 PROCEDURE — 3008F BODY MASS INDEX DOCD: CPT | Mod: CPTII,S$GLB,, | Performed by: FAMILY MEDICINE

## 2020-03-20 PROCEDURE — 3077F SYST BP >= 140 MM HG: CPT | Mod: CPTII,S$GLB,, | Performed by: FAMILY MEDICINE

## 2020-03-20 PROCEDURE — 3077F PR MOST RECENT SYSTOLIC BLOOD PRESSURE >= 140 MM HG: ICD-10-PCS | Mod: CPTII,S$GLB,, | Performed by: FAMILY MEDICINE

## 2020-03-20 PROCEDURE — 99213 PR OFFICE/OUTPT VISIT, EST, LEVL III, 20-29 MIN: ICD-10-PCS | Mod: S$GLB,,, | Performed by: FAMILY MEDICINE

## 2020-03-20 PROCEDURE — 99999 PR PBB SHADOW E&M-EST. PATIENT-LVL III: CPT | Mod: PBBFAC,,, | Performed by: FAMILY MEDICINE

## 2020-03-20 PROCEDURE — 3079F DIAST BP 80-89 MM HG: CPT | Mod: CPTII,S$GLB,, | Performed by: FAMILY MEDICINE

## 2020-03-20 PROCEDURE — 99999 PR PBB SHADOW E&M-EST. PATIENT-LVL III: ICD-10-PCS | Mod: PBBFAC,,, | Performed by: FAMILY MEDICINE

## 2020-03-20 PROCEDURE — 3008F PR BODY MASS INDEX (BMI) DOCUMENTED: ICD-10-PCS | Mod: CPTII,S$GLB,, | Performed by: FAMILY MEDICINE

## 2020-03-20 PROCEDURE — 99213 OFFICE O/P EST LOW 20 MIN: CPT | Mod: S$GLB,,, | Performed by: FAMILY MEDICINE

## 2020-03-20 PROCEDURE — 3079F PR MOST RECENT DIASTOLIC BLOOD PRESSURE 80-89 MM HG: ICD-10-PCS | Mod: CPTII,S$GLB,, | Performed by: FAMILY MEDICINE

## 2020-03-20 NOTE — PROGRESS NOTES
Office Visit    Patient Name: José Roberson    : 1970  MRN: [id number]    Subjective:  José is a 49 y.o. male who presents today for:    Cough (started on Saturday.  No fever or SOB); Chills (and sweats); and Generalized Body Aches    49-year-old generally healthy patient with history of controlled hypertension but no history of tobacco abuse or lung disease who presents today with almost 1 week history of cough, now accompanied by body aches/chills/fatigue.  He did not take his temperature at home, but he does not have a fever this morning.  He did not take medications this morning.  He took some NyQuil last night to help with body aches and headache, and this did allow him to sleep, and he reports that he feels okay this morning.  His biggest concern is knowing what to do in terms of his work situation.  He denies shortness of breath.    Past Medical History  Past Medical History:   Diagnosis Date    Gout     HTN (hypertension)        Past Surgical History  Past Surgical History:   Procedure Laterality Date    APPENDECTOMY      CHEST TUBE INSERTION      right shoulder surgery         Family History  Family History   Problem Relation Age of Onset    Hypertension Father     Hypertension Mother        Social History  Social History     Socioeconomic History    Marital status: Single     Spouse name: Not on file    Number of children: Not on file    Years of education: Not on file    Highest education level: Not on file   Occupational History    Occupation:      Comment: wine    Social Needs    Financial resource strain: Not on file    Food insecurity:     Worry: Not on file     Inability: Not on file    Transportation needs:     Medical: Not on file     Non-medical: Not on file   Tobacco Use    Smoking status: Never Smoker    Smokeless tobacco: Never Used   Substance and Sexual Activity    Alcohol use: No    Drug use: No    Sexual activity: Yes   Lifestyle    Physical activity:  "    Days per week: Not on file     Minutes per session: Not on file    Stress: Not on file   Relationships    Social connections:     Talks on phone: Not on file     Gets together: Not on file     Attends Religion service: Not on file     Active member of club or organization: Not on file     Attends meetings of clubs or organizations: Not on file     Relationship status: Not on file   Other Topics Concern    Not on file   Social History Narrative    Not on file       Current Medications  Medications reviewed and updated.     Allergies   Review of patient's allergies indicates:  No Known Allergies    Review of Systems (Pertinent positives)  Review of Systems   Constitutional: Positive for chills and fatigue.   Respiratory: Positive for cough. Negative for shortness of breath.    Musculoskeletal: Positive for myalgias.       BP (!) 142/86 (BP Location: Right arm, Patient Position: Sitting)   Pulse 94   Temp 98.6 °F (37 °C) (Oral)   Ht 6' 1" (1.854 m)   Wt 109.6 kg (241 lb 10 oz)   SpO2 98%   BMI 31.88 kg/m²     Physical Exam   Constitutional: He is oriented to person, place, and time. He appears well-developed and well-nourished. No distress.   Pulmonary/Chest: Effort normal.   Neurological: He is alert and oriented to person, place, and time.   Psychiatric: He has a normal mood and affect.   Vitals reviewed.    Patient not directly examined to avoid need for PPE donning (possible COVID-19 patient) as he is not high risk, he is clinically stable and with normal vitals/oxygen saturation.    Assessment/Plan:  José Roberson is a 49 y.o. male who presents today for :    José was seen today for cough, chills and generalized body aches.    Diagnoses and all orders for this visit:    Viral syndrome  Comments:  Does not meet criteria for COVID-19 testing. cannot R/O. advised on self-quarantine guidelines, supportive care, and ER precautions. work note given.     Essential hypertension  Comments:  mildly " elevated today but much improved form previous reading on Avapro 150.             ICD-10-CM ICD-9-CM    1. Viral syndrome B34.9 079.99     Does not meet criteria for COVID-19 testing. cannot R/O. advised on self-quarantine guidelines, supportive care, and ER precautions. work note given.    2. Essential hypertension I10 401.9     mildly elevated today but much improved form previous reading on Avapro 150.        Patient Instructions   PATIENT ADVISED TO SELF-QUARANTINE SECONDARY TO CURRENT VIRAL SYMPTOMS.    ADVISED THAT HE MAY HAVE FLU VERSUS COVID-19, BUT HE DOES NOT MEET CLINICAL CRITERIA AT THIS TIME FOR COVID- 19 TESTING.    HE IS ADVISED TO SELF- QUARANTINE UNTIL HE HAS NO SYSTEMIC SYMPTOMS SUCH AS FEVER/CHILLS/BODY ACHES FOR AT LEAST 72 HOURS WITHOUT THE USE OF FEVER REDUCING MEDICATIONS.    HE IS ADVISED TO TAKE TYLENOL EVERY 6 HOURS AS NEEDED FOR FEVER/BODY ACHES, HYDRATE, REST.    PLEASE SEEK URGENT MEDICAL ATTENTION AT THE ER IF YOU ARE DEVELOPING SYMPTOMS SUCH AS SHORTNESS OF BREATH, OTHERWISE YOUR SYMPTOMS SHOULD SELF RESOLVE WITHIN AN ADDITIONAL WEEK.        Follow up for return as needed for new concerns.

## 2020-03-20 NOTE — TELEPHONE ENCOUNTER
Please print the following on official letter head:      2020      To Whom It May Concern:     José Crabtree ( 1970 ) presented to [facility] on 3/20/20 for evaluation of viral symptoms.  His vital signs including oxygen and temperature were found to be within normal limits, however, he reports ongoing cough and chills/body aches at home.    Because of current restrictions on the use of COVID-19 testing, he is unable to be tested at this time.  He is advised to self quarantine until he has no fever/chills/body aches for greater than 72 hours without the use of analgesics such as Tylenol or Advil.    Please support him in taking a leave of absence from his work until that time in the interest of his own and public safety.        Thank you for your consideration,                      Josie Rutherford MD, Board Certified Family Physician

## 2020-03-20 NOTE — LETTER
2020    José Roberson  [address] [place] [address] [address] [place] [address]             [address], [address]  [place] [address]  Phone: [phone]  Fax: [phone] To Whom It May Concern:      José Crabtree ( 1970 ) presented to [facility] on 3/20/20 for evaluation of viral symptoms.  His vital signs including oxygen and temperature were found to be within normal limits, however, he reports ongoing cough and chills/body aches at home.     Because of current restrictions on the use of COVID-19 testing, he is unable to be tested at this time.  He is advised to self quarantine until he has no fever/chills/body aches for greater than 72 hours without the use of analgesics such as Tylenol or Advil.     Please support him in taking a leave of absence from his work until that time in the interest of his own and public safety.           Thank you for your consideration,                                Josie Rutherford MD, Board Certified Family Physician

## 2020-03-20 NOTE — PATIENT INSTRUCTIONS
PATIENT ADVISED TO SELF-QUARANTINE SECONDARY TO CURRENT VIRAL SYMPTOMS.    ADVISED THAT HE MAY HAVE FLU VERSUS COVID-19, BUT HE DOES NOT MEET CLINICAL CRITERIA AT THIS TIME FOR COVID- 19 TESTING.    HE IS ADVISED TO SELF- QUARANTINE UNTIL HE HAS NO SYSTEMIC SYMPTOMS SUCH AS FEVER/CHILLS/BODY ACHES FOR AT LEAST 72 HOURS WITHOUT THE USE OF FEVER REDUCING MEDICATIONS.    HE IS ADVISED TO TAKE TYLENOL EVERY 6 HOURS AS NEEDED FOR FEVER/BODY ACHES, HYDRATE, REST.    PLEASE SEEK URGENT MEDICAL ATTENTION AT THE ER IF YOU ARE DEVELOPING SYMPTOMS SUCH AS SHORTNESS OF BREATH, OTHERWISE YOUR SYMPTOMS SHOULD SELF RESOLVE WITHIN AN ADDITIONAL WEEK.

## 2020-03-20 NOTE — TELEPHONE ENCOUNTER
Contacted patient to discuss symptoms for upcoming appointment. Screening questions asked. Patient does not meet criteria for COVID-19 testing.    Reports post nasal drip, cough, chills, sweats, and myalgias x past 6 days that worsened yesterday. He is sure he had a fever, but did not check his own temp. Denies SOB, recent travel, sick contacts, or contact with anyone who has confirmed/presumptive positive COVID-19. Works as a .      Melanie Wick, NP

## 2020-03-23 ENCOUNTER — PATIENT MESSAGE (OUTPATIENT)
Dept: FAMILY MEDICINE | Facility: CLINIC | Age: 50
End: 2020-03-23

## 2020-03-23 ENCOUNTER — TELEPHONE (OUTPATIENT)
Dept: FAMILY MEDICINE | Facility: CLINIC | Age: 50
End: 2020-03-23

## 2020-03-23 ENCOUNTER — OFFICE VISIT (OUTPATIENT)
Dept: FAMILY MEDICINE | Facility: CLINIC | Age: 50
End: 2020-03-23
Payer: COMMERCIAL

## 2020-03-23 DIAGNOSIS — B34.9 VIRAL ILLNESS: Primary | ICD-10-CM

## 2020-03-23 PROCEDURE — 99212 OFFICE O/P EST SF 10 MIN: CPT | Mod: 95,,, | Performed by: FAMILY MEDICINE

## 2020-03-23 PROCEDURE — 99212 PR OFFICE/OUTPT VISIT, EST, LEVL II, 10-19 MIN: ICD-10-PCS | Mod: 95,,, | Performed by: FAMILY MEDICINE

## 2020-03-23 RX ORDER — PROMETHAZINE HYDROCHLORIDE AND DEXTROMETHORPHAN HYDROBROMIDE 6.25; 15 MG/5ML; MG/5ML
5 SYRUP ORAL EVERY 4 HOURS PRN
Qty: 140 ML | Refills: 1 | Status: SHIPPED | OUTPATIENT
Start: 2020-03-23 | End: 2020-04-02

## 2020-03-23 NOTE — PROGRESS NOTES
The patient location is:  Home  The chief complaint leading to consultation is:  Cough myalgia  Visit type: Virtual visit with synchronous audio and video  Total time spent with patient:  10 minutes    Each patient to whom he or she provides medical services by telemedicine is:  (1) informed of the relationship between the physician and patient and the respective role of any other health care provider with respect to management of the patient; and (2) notified that he or she may decline to receive medical services by telemedicine and may withdraw from such care at any time.      Patient ID: José Roberson is a 49 y.o. male.    Chief Complaint: Cough    HPI       José Roberson is a 49 y.o. male patient with greater than one week history of having cough mostly nonproductive.  Body aches chills and fatigue.  He has continued to feel bad at home.  Using NyQuil periodically.  Went to urgent care where he spoke to the doctor by phone.  Diagnosed him with viral infection possibly SARs 19.  Sent home to take Tylenol.  Has not taken Tylenol because he was not treated appropriately with just a telephone visit with the doctor.      Review of Symptoms    Constitutional  fatigued-chills and fever  Resp  Neg hemoptysis, stridor, choking  CVS  Neg chest pain, palpitations    There were no vitals taken for this visit.    Physical Exam    There were no vitals filed for this visit.    Constitutional:   Oriented to person, place, and time.appears well-developed and well-nourished.  Sitting comfortably in chair not short of breath no dyspnea   No distress.        Psychiatric: Normal mood and affect. Behavior is normal. Judgment and thought content normal.     Complete Blood Count  Lab Results   Component Value Date    RBC 5.08 11/20/2019    HGB 15.2 11/20/2019    HCT 44.3 11/20/2019    MCV 87.2 11/20/2019    MCH 29.9 11/20/2019    MCHC 34.3 11/20/2019    RDW 13.2 11/20/2019     11/20/2019    MPV 11.0 11/20/2019    GRAN 3.2  11/07/2016    GRAN 54.0 11/07/2016    LYMPH 2,060 11/20/2019    LYMPH 41.2 11/20/2019    MONO 290 11/20/2019    MONO 5.8 11/20/2019    EOS 30 11/20/2019    BASO 20 11/20/2019    EOSINOPHIL 0.6 11/20/2019    BASOPHIL 0.4 11/20/2019    DIFFMETHOD Automated 11/07/2016       Comprehensive Metabolic Panel  Lab Results   Component Value Date    GLU 79 11/20/2019    BUN 11 11/20/2019    CREATININE 1.09 11/20/2019     11/20/2019    K 3.7 11/20/2019     11/20/2019    PROT 7.0 11/20/2019    ALBUMIN 4.3 11/20/2019    BILITOT 0.8 11/20/2019    AST 32 11/20/2019    ALKPHOS 85 11/20/2019    CO2 27 11/20/2019    ALT 33 11/20/2019    EGFRNONAA 81 11/20/2019    ESTGFRAFRICA 94 11/20/2019       TSH  Lab Results   Component Value Date    TSH 2.08 11/20/2019       Assessment / Plan:      ICD-10-CM ICD-9-CM   1. Viral illness B34.9 079.99     Viral illness  Comments:  High suspicion for SARs corona 19-discussed symptomatic treatment Tylenol 1000 milligrams every 6 hours-Motrin if needed cough medicine called in    Other orders  -     promethazine-dextromethorphan (PROMETHAZINE-DM) 6.25-15 mg/5 mL Syrp; Take 5 mLs by mouth every 4 (four) hours as needed.  Dispense: 140 mL; Refill: 1

## 2020-03-23 NOTE — TELEPHONE ENCOUNTER
MAYELA    Patient states he's coughing, congestion, body aches, chills. Saw someone on Friday at [place]. It was not a actual visit he was placed in a room the medical assistant took his vitals and the doctor called him on a phone that was in the room.  He states he was not qualified to take the test for COVID-19. Patient scheduled for a virtual visit today.        ----- Message from Vicky Rutherford sent at 3/23/2020 10:18 AM CDT -----  Type:  Same Day Appointment Request    Caller is requesting a same day appointment.  Caller declined first available appointment listed below.    Name of Caller:self   When is the first available appointment?04/20/2020  Symptoms:coughing, chills, and nasal congestion   Best Call Back Number: [phone]  Additional Information:Patient would like to be seen for symptoms.

## 2020-03-30 ENCOUNTER — PATIENT MESSAGE (OUTPATIENT)
Dept: FAMILY MEDICINE | Facility: CLINIC | Age: 50
End: 2020-03-30

## 2020-03-30 ENCOUNTER — TELEPHONE (OUTPATIENT)
Dept: FAMILY MEDICINE | Facility: CLINIC | Age: 50
End: 2020-03-30

## 2020-03-30 NOTE — LETTER
March 30, 2020    José Roberson  [address] [place] [address]         [facility]  [address]  [place] [address]  Phone: [phone]  Fax: [phone] March 30, 2020     Patient: José Roberson   YOB: 1970   Date of Visit: 3/30/2020       To Whom It May Concern:    It is my medical opinion that José Roberson may return to work on March 30, 2020. He had a viral illness that started more than 2 weeks ago and he has no symptoms for over 3 days.      If you have any questions or concerns, please don't hesitate to call.    Sincerely,        Shan Bridges MD

## 2020-03-30 NOTE — TELEPHONE ENCOUNTER
----- Message from Radha Rosales sent at 3/30/2020  8:02 AM CDT -----  Contact: patient  Type:  Needs Medical Advice    Who Called: Travis  Would the patient rather a call back or a response via [facility]? Call back  Best Call Back Number: [phone]  Additional Information: this is the 10th day of his quarantine with no medication and no symptoms so he wants to know if he can be released to return to work

## 2020-05-12 RX ORDER — ALLOPURINOL 100 MG/1
100 TABLET ORAL DAILY
Qty: 90 TABLET | Refills: 3 | Status: SHIPPED | OUTPATIENT
Start: 2020-05-12 | End: 2021-04-26 | Stop reason: SDUPTHER

## 2020-05-12 RX ORDER — AMLODIPINE BESYLATE 10 MG/1
TABLET ORAL
Qty: 90 TABLET | Refills: 3 | Status: SHIPPED | OUTPATIENT
Start: 2020-05-12 | End: 2021-04-26 | Stop reason: SDUPTHER

## 2020-09-17 ENCOUNTER — TELEPHONE (OUTPATIENT)
Dept: FAMILY MEDICINE | Facility: CLINIC | Age: 50
End: 2020-09-17

## 2020-09-17 DIAGNOSIS — Z11.59 NEED FOR HEPATITIS C SCREENING TEST: Primary | ICD-10-CM

## 2021-03-03 RX ORDER — IRBESARTAN 150 MG/1
150 TABLET ORAL NIGHTLY
Qty: 30 TABLET | Refills: 0 | Status: SHIPPED | OUTPATIENT
Start: 2021-03-03 | End: 2021-04-13

## 2021-04-05 ENCOUNTER — PATIENT MESSAGE (OUTPATIENT)
Dept: ADMINISTRATIVE | Facility: HOSPITAL | Age: 51
End: 2021-04-05

## 2021-04-13 RX ORDER — IRBESARTAN 150 MG/1
150 TABLET ORAL NIGHTLY
Qty: 30 TABLET | Refills: 0 | Status: SHIPPED | OUTPATIENT
Start: 2021-04-13 | End: 2021-04-26 | Stop reason: SDUPTHER

## 2021-04-14 RX ORDER — IRBESARTAN 150 MG/1
150 TABLET ORAL NIGHTLY
OUTPATIENT
Start: 2021-04-14 | End: 2022-04-14

## 2021-04-16 ENCOUNTER — PATIENT MESSAGE (OUTPATIENT)
Dept: RESEARCH | Facility: HOSPITAL | Age: 51
End: 2021-04-16

## 2021-04-26 ENCOUNTER — OFFICE VISIT (OUTPATIENT)
Dept: FAMILY MEDICINE | Facility: CLINIC | Age: 51
End: 2021-04-26
Payer: COMMERCIAL

## 2021-04-26 ENCOUNTER — LAB VISIT (OUTPATIENT)
Dept: LAB | Facility: HOSPITAL | Age: 51
End: 2021-04-26
Attending: FAMILY MEDICINE
Payer: COMMERCIAL

## 2021-04-26 ENCOUNTER — TELEPHONE (OUTPATIENT)
Dept: FAMILY MEDICINE | Facility: CLINIC | Age: 51
End: 2021-04-26

## 2021-04-26 VITALS
DIASTOLIC BLOOD PRESSURE: 82 MMHG | HEART RATE: 92 BPM | OXYGEN SATURATION: 98 % | WEIGHT: 252 LBS | TEMPERATURE: 98 F | HEIGHT: 73 IN | SYSTOLIC BLOOD PRESSURE: 138 MMHG | BODY MASS INDEX: 33.4 KG/M2

## 2021-04-26 DIAGNOSIS — I10 ESSENTIAL HYPERTENSION: ICD-10-CM

## 2021-04-26 DIAGNOSIS — M10.9 GOUT, UNSPECIFIED CAUSE, UNSPECIFIED CHRONICITY, UNSPECIFIED SITE: Primary | ICD-10-CM

## 2021-04-26 DIAGNOSIS — I10 HYPERTENSION, UNSPECIFIED TYPE: ICD-10-CM

## 2021-04-26 DIAGNOSIS — M10.9 GOUT, UNSPECIFIED CAUSE, UNSPECIFIED CHRONICITY, UNSPECIFIED SITE: ICD-10-CM

## 2021-04-26 DIAGNOSIS — Z11.59 NEED FOR HEPATITIS C SCREENING TEST: ICD-10-CM

## 2021-04-26 LAB
ALBUMIN SERPL BCP-MCNC: 4.4 G/DL (ref 3.5–5.2)
ALP SERPL-CCNC: 120 U/L (ref 38–126)
ALT SERPL W/O P-5'-P-CCNC: 53 U/L (ref 10–44)
ANION GAP SERPL CALC-SCNC: 7 MMOL/L (ref 8–16)
AST SERPL-CCNC: 40 U/L (ref 15–46)
BASOPHILS # BLD AUTO: 0.02 K/UL (ref 0–0.2)
BASOPHILS NFR BLD: 0.3 % (ref 0–1.9)
BILIRUB SERPL-MCNC: 0.8 MG/DL (ref 0.1–1)
CALCIUM SERPL-MCNC: 9.4 MG/DL (ref 8.7–10.5)
CHLORIDE SERPL-SCNC: 105 MMOL/L (ref 95–110)
CHOLEST SERPL-MCNC: 175 MG/DL (ref 120–199)
CHOLEST/HDLC SERPL: 3.2 {RATIO} (ref 2–5)
CO2 SERPL-SCNC: 30 MMOL/L (ref 23–29)
CREAT SERPL-MCNC: 1.14 MG/DL (ref 0.5–1.4)
DIFFERENTIAL METHOD: NORMAL
EOSINOPHIL # BLD AUTO: 0 K/UL (ref 0–0.5)
EOSINOPHIL NFR BLD: 0.5 % (ref 0–8)
ERYTHROCYTE [DISTWIDTH] IN BLOOD BY AUTOMATED COUNT: 13.1 % (ref 11.5–14.5)
EST. GFR  (AFRICAN AMERICAN): >60 ML/MIN/1.73 M^2
EST. GFR  (NON AFRICAN AMERICAN): >60 ML/MIN/1.73 M^2
GLUCOSE SERPL-MCNC: 99 MG/DL (ref 70–110)
HCT VFR BLD AUTO: 44.2 % (ref 40–54)
HDLC SERPL-MCNC: 54 MG/DL (ref 40–75)
HDLC SERPL: 30.9 % (ref 20–50)
HGB BLD-MCNC: 14.3 G/DL (ref 14–18)
IMM GRANULOCYTES # BLD AUTO: 0.01 K/UL (ref 0–0.04)
IMM GRANULOCYTES NFR BLD AUTO: 0.2 % (ref 0–0.5)
LDLC SERPL CALC-MCNC: 94.8 MG/DL (ref 63–159)
LYMPHOCYTES # BLD AUTO: 2.2 K/UL (ref 1–4.8)
LYMPHOCYTES NFR BLD: 36.7 % (ref 18–48)
MCH RBC QN AUTO: 28.8 PG (ref 27–31)
MCHC RBC AUTO-ENTMCNC: 32.4 G/DL (ref 32–36)
MCV RBC AUTO: 89 FL (ref 82–98)
MONOCYTES # BLD AUTO: 0.3 K/UL (ref 0.3–1)
MONOCYTES NFR BLD: 5.2 % (ref 4–15)
NEUTROPHILS # BLD AUTO: 3.4 K/UL (ref 1.8–7.7)
NEUTROPHILS NFR BLD: 57.1 % (ref 38–73)
NONHDLC SERPL-MCNC: 121 MG/DL
NRBC BLD-RTO: 0 /100 WBC
PLATELET # BLD AUTO: 221 K/UL (ref 150–450)
PMV BLD AUTO: 10.6 FL (ref 9.2–12.9)
POTASSIUM SERPL-SCNC: 4 MMOL/L (ref 3.5–5.1)
PROT SERPL-MCNC: 8 G/DL (ref 6–8.4)
RBC # BLD AUTO: 4.97 M/UL (ref 4.6–6.2)
SODIUM SERPL-SCNC: 142 MMOL/L (ref 136–145)
TRIGL SERPL-MCNC: 131 MG/DL (ref 30–150)
TSH SERPL DL<=0.005 MIU/L-ACNC: 2.04 UIU/ML (ref 0.4–4)
URATE SERPL-MCNC: 6.1 MG/DL (ref 3.4–7)
UUN UR-MCNC: 9 MG/DL (ref 2–20)
WBC # BLD AUTO: 5.96 K/UL (ref 3.9–12.7)

## 2021-04-26 PROCEDURE — 80053 COMPREHEN METABOLIC PANEL: CPT | Mod: PO | Performed by: FAMILY MEDICINE

## 2021-04-26 PROCEDURE — 99396 PREV VISIT EST AGE 40-64: CPT | Mod: S$GLB,,, | Performed by: FAMILY MEDICINE

## 2021-04-26 PROCEDURE — 85025 COMPLETE CBC W/AUTO DIFF WBC: CPT | Mod: PO | Performed by: FAMILY MEDICINE

## 2021-04-26 PROCEDURE — 99396 PR PREVENTIVE VISIT,EST,40-64: ICD-10-PCS | Mod: S$GLB,,, | Performed by: FAMILY MEDICINE

## 2021-04-26 PROCEDURE — 84443 ASSAY THYROID STIM HORMONE: CPT | Mod: PO | Performed by: FAMILY MEDICINE

## 2021-04-26 PROCEDURE — 1126F PR PAIN SEVERITY QUANTIFIED, NO PAIN PRESENT: ICD-10-PCS | Mod: S$GLB,,, | Performed by: FAMILY MEDICINE

## 2021-04-26 PROCEDURE — 84550 ASSAY OF BLOOD/URIC ACID: CPT | Performed by: FAMILY MEDICINE

## 2021-04-26 PROCEDURE — 36415 COLL VENOUS BLD VENIPUNCTURE: CPT | Mod: PO | Performed by: FAMILY MEDICINE

## 2021-04-26 PROCEDURE — 86803 HEPATITIS C AB TEST: CPT | Mod: PO | Performed by: FAMILY MEDICINE

## 2021-04-26 PROCEDURE — 3008F PR BODY MASS INDEX (BMI) DOCUMENTED: ICD-10-PCS | Mod: CPTII,S$GLB,, | Performed by: FAMILY MEDICINE

## 2021-04-26 PROCEDURE — 3008F BODY MASS INDEX DOCD: CPT | Mod: CPTII,S$GLB,, | Performed by: FAMILY MEDICINE

## 2021-04-26 PROCEDURE — 1126F AMNT PAIN NOTED NONE PRSNT: CPT | Mod: S$GLB,,, | Performed by: FAMILY MEDICINE

## 2021-04-26 PROCEDURE — 80061 LIPID PANEL: CPT | Performed by: FAMILY MEDICINE

## 2021-04-26 RX ORDER — AMLODIPINE BESYLATE 10 MG/1
10 TABLET ORAL DAILY
Qty: 90 TABLET | Refills: 3 | Status: SHIPPED | OUTPATIENT
Start: 2021-04-26 | End: 2022-05-29

## 2021-04-26 RX ORDER — IRBESARTAN 300 MG/1
300 TABLET ORAL NIGHTLY
Qty: 90 TABLET | Refills: 3 | Status: SHIPPED | OUTPATIENT
Start: 2021-04-26 | End: 2021-06-15 | Stop reason: SDUPTHER

## 2021-04-26 RX ORDER — ALLOPURINOL 100 MG/1
100 TABLET ORAL DAILY
Qty: 90 TABLET | Refills: 3 | Status: SHIPPED | OUTPATIENT
Start: 2021-04-26 | End: 2022-05-29

## 2021-04-27 LAB — HCV AB SERPL QL IA: NEGATIVE

## 2021-06-15 ENCOUNTER — TELEPHONE (OUTPATIENT)
Dept: FAMILY MEDICINE | Facility: CLINIC | Age: 51
End: 2021-06-15

## 2021-06-15 RX ORDER — IRBESARTAN 300 MG/1
300 TABLET ORAL NIGHTLY
Qty: 90 TABLET | Refills: 3 | Status: SHIPPED | OUTPATIENT
Start: 2021-06-15 | End: 2022-06-15

## 2022-05-29 RX ORDER — ALLOPURINOL 100 MG/1
TABLET ORAL
Qty: 90 TABLET | Refills: 0 | Status: SHIPPED | OUTPATIENT
Start: 2022-05-29 | End: 2022-08-29 | Stop reason: SDUPTHER

## 2022-05-29 RX ORDER — AMLODIPINE BESYLATE 10 MG/1
TABLET ORAL
Qty: 90 TABLET | Refills: 0 | Status: SHIPPED | OUTPATIENT
Start: 2022-05-29 | End: 2022-09-02

## 2022-09-01 RX ORDER — IRBESARTAN 300 MG/1
300 TABLET ORAL NIGHTLY
Qty: 90 TABLET | Refills: 2 | OUTPATIENT
Start: 2022-09-01 | End: 2023-09-01

## 2022-09-01 NOTE — TELEPHONE ENCOUNTER
Refill Routing Note   Medication(s) are not appropriate for processing by [facility] for the following reason(s):      - Patient has been seen in the ED/Hospital since the last PCP visit  - Unclear if patient follows with you     [facility] action(s):  Defer       Medication Therapy Plan: No PCP listed; LOV (4/26/21) with Dr. Bridges; Defer  Medication reconciliation completed: No     Appointments  past 12m or future 3m with PCP    Date Provider   Last Visit   4/26/2021 Shan Bridges MD   Next Visit   Visit date not found Shan Bridges MD   ED visits in past 90 days: 0        Note composed:5:46 AM 09/01/2022